# Patient Record
Sex: FEMALE | URBAN - METROPOLITAN AREA
[De-identification: names, ages, dates, MRNs, and addresses within clinical notes are randomized per-mention and may not be internally consistent; named-entity substitution may affect disease eponyms.]

---

## 2020-09-24 ENCOUNTER — OFFICE VISIT (OUTPATIENT)
Dept: URBAN - METROPOLITAN AREA CLINIC 115 | Facility: CLINIC | Age: 70
End: 2020-09-24

## 2020-10-15 ENCOUNTER — OFFICE VISIT (OUTPATIENT)
Dept: URBAN - METROPOLITAN AREA CLINIC 115 | Facility: CLINIC | Age: 70
End: 2020-10-15

## 2020-11-18 ENCOUNTER — OFFICE VISIT (OUTPATIENT)
Dept: URBAN - METROPOLITAN AREA CLINIC 82 | Facility: CLINIC | Age: 70
End: 2020-11-18
Payer: COMMERCIAL

## 2020-11-18 DIAGNOSIS — K22.70 BARRETT'S ESOPHAGUS: ICD-10-CM

## 2020-11-18 DIAGNOSIS — K59.00 CONSTIPATION: ICD-10-CM

## 2020-11-18 DIAGNOSIS — K57.90 DIVERTICULOSIS: ICD-10-CM

## 2020-11-18 DIAGNOSIS — Z12.11 COLON CANCER SCREENING: ICD-10-CM

## 2020-11-18 PROBLEM — 302914006 BARRETT'S ESOPHAGUS: Status: ACTIVE | Noted: 2020-11-18

## 2020-11-18 PROBLEM — 275978004 COLON CANCER SCREENING: Status: ACTIVE | Noted: 2020-11-18

## 2020-11-18 PROCEDURE — 1036F TOBACCO NON-USER: CPT | Performed by: INTERNAL MEDICINE

## 2020-11-18 PROCEDURE — G8417 CALC BMI ABV UP PARAM F/U: HCPCS | Performed by: INTERNAL MEDICINE

## 2020-11-18 PROCEDURE — G9903 PT SCRN TBCO ID AS NON USER: HCPCS | Performed by: INTERNAL MEDICINE

## 2020-11-18 PROCEDURE — G8427 DOCREV CUR MEDS BY ELIG CLIN: HCPCS | Performed by: INTERNAL MEDICINE

## 2020-11-18 PROCEDURE — 99244 OFF/OP CNSLTJ NEW/EST MOD 40: CPT | Performed by: INTERNAL MEDICINE

## 2020-11-18 PROCEDURE — 99204 OFFICE O/P NEW MOD 45 MIN: CPT | Performed by: INTERNAL MEDICINE

## 2020-11-18 PROCEDURE — G8482 FLU IMMUNIZE ORDER/ADMIN: HCPCS | Performed by: INTERNAL MEDICINE

## 2020-11-18 RX ORDER — ASCORBIC ACID 100 MG
1 TABLET TABLET,CHEWABLE ORAL ONCE A DAY
Status: ACTIVE | COMMUNITY

## 2020-11-18 RX ORDER — PLECANATIDE 3 MG/1
1 TABLET TABLET ORAL ONCE A DAY
Qty: 90 | Refills: 1 | OUTPATIENT
Start: 2020-11-18 | End: 2021-05-17

## 2020-11-18 RX ORDER — SODIUM, POTASSIUM,MAG SULFATES 17.5-3.13G
354 ML SOLUTION, RECONSTITUTED, ORAL ORAL
Qty: 354 ML | Refills: 0 | OUTPATIENT
Start: 2020-11-18 | End: 2020-11-19

## 2020-11-18 RX ORDER — AMLODIPINE BESYLATE 5 MG/1
1 TABLET TABLET ORAL ONCE A DAY
Status: ACTIVE | COMMUNITY

## 2020-11-18 RX ORDER — ZINC 25 MG
1 TABLET TABLET ORAL ONCE A DAY
Status: ACTIVE | COMMUNITY

## 2020-11-18 RX ORDER — MONTELUKAST SODIUM 10 MG/1
1 TABLET TABLET ORAL ONCE A DAY
Status: ACTIVE | COMMUNITY

## 2020-11-18 RX ORDER — PANTOPRAZOLE SODIUM 40 MG/1
1 TABLET TABLET, DELAYED RELEASE ORAL ONCE A DAY
Qty: 90 | Refills: 1 | OUTPATIENT
Start: 2020-11-18

## 2020-11-18 NOTE — HPI-TODAY'S VISIT:
2020 Patient is a 70 year old female, who presents on referral from Dr. Rosie Go,  for a colonoscopy. Previous colonoscopy was done on 2015 showed diverticulosis. EGD done 2015 showed gastritis and hiatal hernia. 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 Both procedures were done in [place].  Patient complains of change in bowel habits. She has mostly constipation. She complains of straining. She has tried to eat a bland diet for constipation. She has not tried any prescription medications for constipation. She has occasional heartburn and acid reflux. She is not on any PPI therapy. She denies any immediate family with colon cancer or colon polyps, but does mention that a cousin on her father's side, who  of colon cancer.

## 2020-12-09 ENCOUNTER — OFFICE VISIT (OUTPATIENT)
Dept: URBAN - METROPOLITAN AREA SURGERY CENTER 13 | Facility: SURGERY CENTER | Age: 70
End: 2020-12-09

## 2020-12-24 ENCOUNTER — OFFICE VISIT (OUTPATIENT)
Dept: URBAN - METROPOLITAN AREA SURGERY CENTER 13 | Facility: SURGERY CENTER | Age: 70
End: 2020-12-24

## 2020-12-30 ENCOUNTER — OFFICE VISIT (OUTPATIENT)
Dept: URBAN - METROPOLITAN AREA SURGERY CENTER 13 | Facility: SURGERY CENTER | Age: 70
End: 2020-12-30

## 2021-01-22 ENCOUNTER — OFFICE VISIT (OUTPATIENT)
Dept: URBAN - METROPOLITAN AREA SURGERY CENTER 13 | Facility: SURGERY CENTER | Age: 71
End: 2021-01-22
Payer: COMMERCIAL

## 2021-01-22 ENCOUNTER — CLAIMS CREATED FROM THE CLAIM WINDOW (OUTPATIENT)
Dept: URBAN - METROPOLITAN AREA CLINIC 4 | Facility: CLINIC | Age: 71
End: 2021-01-22
Payer: COMMERCIAL

## 2021-01-22 DIAGNOSIS — K62.6 ANAL ULCER: ICD-10-CM

## 2021-01-22 DIAGNOSIS — K63.89 OTHER SPECIFIED DISEASES OF INTESTINE: ICD-10-CM

## 2021-01-22 DIAGNOSIS — K62.6 ULCER OF ANUS AND RECTUM: ICD-10-CM

## 2021-01-22 DIAGNOSIS — Z12.11 COLON CANCER SCREENING: ICD-10-CM

## 2021-01-22 DIAGNOSIS — K62.1 DYSPLASTIC POLYP OF RECTUM: ICD-10-CM

## 2021-01-22 PROCEDURE — G8907 PT DOC NO EVENTS ON DISCHARG: HCPCS | Performed by: INTERNAL MEDICINE

## 2021-01-22 PROCEDURE — 45380 COLONOSCOPY AND BIOPSY: CPT | Performed by: INTERNAL MEDICINE

## 2021-01-22 PROCEDURE — 88305 TISSUE EXAM BY PATHOLOGIST: CPT | Performed by: PATHOLOGY

## 2021-01-27 ENCOUNTER — OFFICE VISIT (OUTPATIENT)
Dept: URBAN - METROPOLITAN AREA SURGERY CENTER 13 | Facility: SURGERY CENTER | Age: 71
End: 2021-01-27
Payer: COMMERCIAL

## 2021-01-27 ENCOUNTER — CLAIMS CREATED FROM THE CLAIM WINDOW (OUTPATIENT)
Dept: URBAN - METROPOLITAN AREA CLINIC 4 | Facility: CLINIC | Age: 71
End: 2021-01-27
Payer: COMMERCIAL

## 2021-01-27 DIAGNOSIS — R10.13 ABDOMINAL DISCOMFORT, EPIGASTRIC: ICD-10-CM

## 2021-01-27 DIAGNOSIS — K29.60 OTHER GASTRITIS WITHOUT BLEEDING: ICD-10-CM

## 2021-01-27 DIAGNOSIS — K31.89 ACQUIRED DEFORMITY OF DUODENUM: ICD-10-CM

## 2021-01-27 DIAGNOSIS — K22.719 BARRETT'S ESOPHAGUS WITH DYSPLASIA, UNSPECIFIED: ICD-10-CM

## 2021-01-27 DIAGNOSIS — K31.89 OTHER DISEASES OF STOMACH AND DUODENUM: ICD-10-CM

## 2021-01-27 DIAGNOSIS — K22.70 BARRETT ESOPHAGUS: ICD-10-CM

## 2021-01-27 PROCEDURE — 88342 IMHCHEM/IMCYTCHM 1ST ANTB: CPT | Performed by: PATHOLOGY

## 2021-01-27 PROCEDURE — G8907 PT DOC NO EVENTS ON DISCHARG: HCPCS | Performed by: INTERNAL MEDICINE

## 2021-01-27 PROCEDURE — 43239 EGD BIOPSY SINGLE/MULTIPLE: CPT | Performed by: INTERNAL MEDICINE

## 2021-01-27 PROCEDURE — 88305 TISSUE EXAM BY PATHOLOGIST: CPT | Performed by: PATHOLOGY

## 2021-01-27 PROCEDURE — 88312 SPECIAL STAINS GROUP 1: CPT | Performed by: PATHOLOGY

## 2021-02-17 ENCOUNTER — OFFICE VISIT (OUTPATIENT)
Dept: URBAN - METROPOLITAN AREA CLINIC 82 | Facility: CLINIC | Age: 71
End: 2021-02-17
Payer: COMMERCIAL

## 2021-02-17 VITALS
SYSTOLIC BLOOD PRESSURE: 161 MMHG | HEART RATE: 75 BPM | HEIGHT: 60 IN | DIASTOLIC BLOOD PRESSURE: 96 MMHG | WEIGHT: 181 LBS | BODY MASS INDEX: 35.53 KG/M2 | RESPIRATION RATE: 14 BRPM | TEMPERATURE: 97.1 F

## 2021-02-17 DIAGNOSIS — Z85.038 PERSONAL HISTORY OF COLON CANCER: ICD-10-CM

## 2021-02-17 DIAGNOSIS — K57.90 DIVERTICULOSIS: ICD-10-CM

## 2021-02-17 DIAGNOSIS — K22.719 BARRETT'S ESOPHAGUS WITH DYSPLASIA, UNSPECIFIED: ICD-10-CM

## 2021-02-17 DIAGNOSIS — K59.00 CONSTIPATION: ICD-10-CM

## 2021-02-17 DIAGNOSIS — K62.6 ULCER OF ANUS AND RECTUM: ICD-10-CM

## 2021-02-17 PROCEDURE — G9903 PT SCRN TBCO ID AS NON USER: HCPCS | Performed by: INTERNAL MEDICINE

## 2021-02-17 PROCEDURE — 99214 OFFICE O/P EST MOD 30 MIN: CPT | Performed by: INTERNAL MEDICINE

## 2021-02-17 PROCEDURE — G8417 CALC BMI ABV UP PARAM F/U: HCPCS | Performed by: INTERNAL MEDICINE

## 2021-02-17 PROCEDURE — G8427 DOCREV CUR MEDS BY ELIG CLIN: HCPCS | Performed by: INTERNAL MEDICINE

## 2021-02-17 RX ORDER — PANTOPRAZOLE SODIUM 40 MG/1
1 TABLET TABLET, DELAYED RELEASE ORAL ONCE A DAY
Qty: 90 | Refills: 1 | OUTPATIENT
Start: 2021-02-17

## 2021-02-17 RX ORDER — MONTELUKAST SODIUM 10 MG/1
1 TABLET TABLET ORAL ONCE A DAY
Status: ACTIVE | COMMUNITY

## 2021-02-17 RX ORDER — ZINC 25 MG
1 TABLET TABLET ORAL ONCE A DAY
Status: ACTIVE | COMMUNITY

## 2021-02-17 RX ORDER — PLECANATIDE 3 MG/1
1 TABLET TABLET ORAL ONCE A DAY
Qty: 90 | Refills: 1 | Status: ACTIVE | COMMUNITY
Start: 2020-11-18 | End: 2021-05-17

## 2021-02-17 RX ORDER — ASCORBIC ACID 100 MG
1 TABLET TABLET,CHEWABLE ORAL ONCE A DAY
Status: ACTIVE | COMMUNITY

## 2021-02-17 RX ORDER — PANTOPRAZOLE SODIUM 40 MG/1
1 TABLET TABLET, DELAYED RELEASE ORAL ONCE A DAY
Qty: 90 | Refills: 1 | Status: ACTIVE | COMMUNITY
Start: 2020-11-18

## 2021-02-17 RX ORDER — AMLODIPINE BESYLATE 5 MG/1
1 TABLET TABLET ORAL ONCE A DAY
Status: ACTIVE | COMMUNITY

## 2021-02-17 NOTE — HPI-TODAY'S VISIT:
2020 Patient is a 70 year old female, who presents on referral from Dr. Rosie Go,  for a colonoscopy. Previous colonoscopy was done on 2015 showed diverticulosis. EGD done 2015 showed gastritis and hiatal hernia. 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 Both procedures were done in [place]. Patient complains of change in bowel habits. She has mostly constipation. She complains of straining. She has tried to eat a bland diet for constipation. She has not tried any prescription medications for constipation. She has occasional heartburn and acid reflux. She is not on any PPI therapy. She denies any immediate family with colon cancer or colon polyps, but does mention that a cousin on her father's side, who  of colon cancer.   2021 Patient presents for a f/u office visit. Colonoscopy on 2021 showed rectal ulcer and polyp in the rectum. EGD on 2021 showed gastritis and esophagitis. Biopsies showed solitary rectal ulcer syndrome and hyperplastic polyp. 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 Her constipation has improved, still present but not as bad as previously. However, Trulance was too strong for her as it caused her diarrhea. She has started taking a detox tea because meatmucil has been causing her gas, but her bowel movements have improved in the mornings. She is scheduled to get a physical exam with her PCP Dr. Go in the next month.

## 2021-08-25 ENCOUNTER — OFFICE VISIT (OUTPATIENT)
Dept: URBAN - METROPOLITAN AREA CLINIC 82 | Facility: CLINIC | Age: 71
End: 2021-08-25
Payer: COMMERCIAL

## 2021-08-25 DIAGNOSIS — Z85.038 PERSONAL HISTORY OF COLON CANCER: ICD-10-CM

## 2021-08-25 DIAGNOSIS — K22.719 BARRETT'S ESOPHAGUS WITH DYSPLASIA, UNSPECIFIED: ICD-10-CM

## 2021-08-25 DIAGNOSIS — K62.6 ULCER OF ANUS AND RECTUM: ICD-10-CM

## 2021-08-25 DIAGNOSIS — R17 ELEVATED BILIRUBIN: ICD-10-CM

## 2021-08-25 DIAGNOSIS — K57.90 DIVERTICULOSIS: ICD-10-CM

## 2021-08-25 DIAGNOSIS — K59.00 CONSTIPATION: ICD-10-CM

## 2021-08-25 PROCEDURE — G9903 PT SCRN TBCO ID AS NON USER: HCPCS | Performed by: INTERNAL MEDICINE

## 2021-08-25 PROCEDURE — G8427 DOCREV CUR MEDS BY ELIG CLIN: HCPCS | Performed by: INTERNAL MEDICINE

## 2021-08-25 PROCEDURE — G8417 CALC BMI ABV UP PARAM F/U: HCPCS | Performed by: INTERNAL MEDICINE

## 2021-08-25 PROCEDURE — 99214 OFFICE O/P EST MOD 30 MIN: CPT | Performed by: INTERNAL MEDICINE

## 2021-08-25 RX ORDER — PANTOPRAZOLE SODIUM 40 MG/1
1 TABLET TABLET, DELAYED RELEASE ORAL ONCE A DAY
Qty: 90 | Refills: 1 | OUTPATIENT
Start: 2021-08-25

## 2021-08-25 RX ORDER — ASCORBIC ACID 100 MG
1 TABLET TABLET,CHEWABLE ORAL ONCE A DAY
Status: ACTIVE | COMMUNITY

## 2021-08-25 RX ORDER — PANTOPRAZOLE SODIUM 40 MG/1
1 TABLET TABLET, DELAYED RELEASE ORAL ONCE A DAY
Qty: 90 | Refills: 1 | Status: ACTIVE | COMMUNITY
Start: 2020-11-18

## 2021-08-25 RX ORDER — PANTOPRAZOLE SODIUM 40 MG/1
1 TABLET TABLET, DELAYED RELEASE ORAL ONCE A DAY
Qty: 90 | Refills: 1 | Status: ACTIVE | COMMUNITY
Start: 2021-02-17

## 2021-08-25 RX ORDER — MONTELUKAST SODIUM 10 MG/1
1 TABLET TABLET ORAL ONCE A DAY
Status: ACTIVE | COMMUNITY

## 2021-08-25 RX ORDER — AMLODIPINE BESYLATE 5 MG/1
1 TABLET TABLET ORAL ONCE A DAY
Status: ACTIVE | COMMUNITY

## 2021-08-25 RX ORDER — DICYCLOMINE HYDROCHLORIDE 10 MG/1
1 TABLET CAPSULE ORAL THREE TIMES A DAY
Qty: 90 | Refills: 1 | OUTPATIENT
Start: 2021-08-25 | End: 2021-10-24

## 2021-08-25 RX ORDER — ZINC 25 MG
1 TABLET TABLET ORAL ONCE A DAY
Status: ACTIVE | COMMUNITY

## 2021-08-25 NOTE — HPI-TODAY'S VISIT:
2020 Patient is a 70 year old female, who presents on referral from Dr. Rosie Go,  for a colonoscopy. Previous colonoscopy was done on 2015 showed diverticulosis. EGD done 2015 showed gastritis and hiatal hernia. 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 Both procedures were done in [place]. Patient complains of change in bowel habits. She has mostly constipation. She complains of straining. She has tried to eat a bland diet for constipation. She has not tried any prescription medications for constipation. She has occasional heartburn and acid reflux. She is not on any PPI therapy. She denies any immediate family with colon cancer or colon polyps, but does mention that a cousin on her father's side, who  of colon cancer.   2021 Patient presents for a f/u office visit. Colonoscopy on 2021 showed rectal ulcer and polyp in the rectum. EGD on 2021 showed gastritis and esophagitis. Biopsies showed solitary rectal ulcer syndrome and hyperplastic polyp. 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 Her constipation has improved, still present but not as bad as previously. However, Trulance was too strong for her as it caused her diarrhea. She has started taking a detox tea because meatmucil has been causing her gas, but her bowel movements have improved in the mornings. She is scheduled to get a physical exam with her PCP Dr. Go in the next month.  2021 Patient presents for a follow up office visit. Colonoscopy on 2021 showed rectal ulcer and polyp in the rectum. EGD on 2021 showed gastritis and esophagitis. Biopsies showed solitary rectal ulcer syndrome, hyperplastic polyp, and Landeros's esophagus. Labs on 2021 showed cholesterol is slightly high, total bilirubin is high (1.4).   She reports occasional bloating, "heavy feeling" in her epigastric area, especially when eating meat. She does eat lots of broccoli and cauliflower in her diet. She takes Protonix daily. Denies any constipation, diarrhea. She notices occasional black stool especially when eating black beans. She uses Tramadol as needed for pain from Dr. Go. Denies NSAID use. Patient does not use natural supplements.

## 2021-08-29 LAB
ANTI-SMOOTH MUSCLE AB BY IFA: (no result)
BILIRUBIN, DIRECT: 0.18
BILIRUBIN, INDIRECT: 0.82
BILIRUBIN, TOTAL: 1
HBSAG SCREEN: NEGATIVE
HEP A AB, IGM: NEGATIVE
HEP B CORE AB, IGM: NEGATIVE
HEP C VIRUS AB: <0.1
MITOCHONDRIAL (M2) ANTIBODY: <20

## 2021-09-02 ENCOUNTER — TELEPHONE ENCOUNTER (OUTPATIENT)
Dept: URBAN - METROPOLITAN AREA CLINIC 23 | Facility: CLINIC | Age: 71
End: 2021-09-02

## 2021-09-02 RX ORDER — PANTOPRAZOLE SODIUM 40 MG/1
1 TABLET TABLET, DELAYED RELEASE ORAL ONCE A DAY
Qty: 90 | Refills: 1
Start: 2021-08-25

## 2021-09-07 ENCOUNTER — TELEPHONE ENCOUNTER (OUTPATIENT)
Dept: URBAN - METROPOLITAN AREA SURGERY CENTER 30 | Facility: SURGERY CENTER | Age: 71
End: 2021-09-07

## 2021-09-07 RX ORDER — DICYCLOMINE HYDROCHLORIDE 10 MG/1
1 TABLET CAPSULE ORAL THREE TIMES A DAY
Qty: 90 TABLET | Refills: 2 | OUTPATIENT
Start: 2021-08-25 | End: 2021-12-05

## 2022-02-02 ENCOUNTER — OFFICE VISIT (OUTPATIENT)
Dept: URBAN - METROPOLITAN AREA CLINIC 82 | Facility: CLINIC | Age: 72
End: 2022-02-02

## 2022-03-01 ENCOUNTER — OFFICE VISIT (OUTPATIENT)
Dept: URBAN - METROPOLITAN AREA CLINIC 82 | Facility: CLINIC | Age: 72
End: 2022-03-01

## 2022-03-24 ENCOUNTER — WEB ENCOUNTER (OUTPATIENT)
Dept: URBAN - METROPOLITAN AREA CLINIC 82 | Facility: CLINIC | Age: 72
End: 2022-03-24

## 2022-03-30 ENCOUNTER — OFFICE VISIT (OUTPATIENT)
Dept: URBAN - METROPOLITAN AREA CLINIC 82 | Facility: CLINIC | Age: 72
End: 2022-03-30
Payer: COMMERCIAL

## 2022-03-30 DIAGNOSIS — K57.90 DIVERTICULOSIS: ICD-10-CM

## 2022-03-30 DIAGNOSIS — K62.6 ULCER OF ANUS AND RECTUM: ICD-10-CM

## 2022-03-30 DIAGNOSIS — K22.719 BARRETT'S ESOPHAGUS WITH DYSPLASIA, UNSPECIFIED: ICD-10-CM

## 2022-03-30 DIAGNOSIS — R17 ELEVATED BILIRUBIN: ICD-10-CM

## 2022-03-30 DIAGNOSIS — K59.00 CONSTIPATION: ICD-10-CM

## 2022-03-30 DIAGNOSIS — R14.0 BLOATING: ICD-10-CM

## 2022-03-30 DIAGNOSIS — Z85.038 PERSONAL HISTORY OF COLON CANCER: ICD-10-CM

## 2022-03-30 PROCEDURE — G8427 DOCREV CUR MEDS BY ELIG CLIN: HCPCS | Performed by: INTERNAL MEDICINE

## 2022-03-30 PROCEDURE — G8417 CALC BMI ABV UP PARAM F/U: HCPCS | Performed by: INTERNAL MEDICINE

## 2022-03-30 PROCEDURE — G9903 PT SCRN TBCO ID AS NON USER: HCPCS | Performed by: INTERNAL MEDICINE

## 2022-03-30 PROCEDURE — 99214 OFFICE O/P EST MOD 30 MIN: CPT | Performed by: INTERNAL MEDICINE

## 2022-03-30 RX ORDER — PANTOPRAZOLE SODIUM 40 MG/1
1 TABLET TABLET, DELAYED RELEASE ORAL ONCE A DAY
Qty: 90 | Refills: 1 | Status: ACTIVE | COMMUNITY
Start: 2020-11-18

## 2022-03-30 RX ORDER — ZINC 25 MG
1 TABLET TABLET ORAL ONCE A DAY
Status: ACTIVE | COMMUNITY

## 2022-03-30 RX ORDER — PANTOPRAZOLE SODIUM 40 MG/1
1 TABLET TABLET, DELAYED RELEASE ORAL ONCE A DAY
Qty: 90 | Refills: 1 | COMMUNITY
Start: 2021-02-17

## 2022-03-30 RX ORDER — MONTELUKAST SODIUM 10 MG/1
1 TABLET TABLET ORAL ONCE A DAY
Status: ACTIVE | COMMUNITY

## 2022-03-30 RX ORDER — PANTOPRAZOLE SODIUM 40 MG/1
1 TABLET TABLET, DELAYED RELEASE ORAL ONCE A DAY
Qty: 90 | Refills: 1 | COMMUNITY
Start: 2021-08-25

## 2022-03-30 RX ORDER — AMLODIPINE BESYLATE 5 MG/1
1 TABLET TABLET ORAL ONCE A DAY
Status: ACTIVE | COMMUNITY

## 2022-03-30 RX ORDER — ASCORBIC ACID 100 MG
1 TABLET TABLET,CHEWABLE ORAL ONCE A DAY
Status: ACTIVE | COMMUNITY

## 2022-03-30 NOTE — HPI-TODAY'S VISIT:
2020 Patient is a 70 year old female, who presents on referral from Dr. Rosie Go,  for a colonoscopy. Previous colonoscopy was done on 2015 showed diverticulosis. EGD done 2015 showed gastritis and hiatal hernia. 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 Both procedures were done in [place]. Patient complains of change in bowel habits. She has mostly constipation. She complains of straining. She has tried to eat a bland diet for constipation. She has not tried any prescription medications for constipation. She has occasional heartburn and acid reflux. She is not on any PPI therapy. She denies any immediate family with colon cancer or colon polyps, but does mention that a cousin on her father's side, who  of colon cancer.   2021 Patient presents for a f/u office visit. Colonoscopy on 2021 showed rectal ulcer and polyp in the rectum. EGD on 2021 showed gastritis and esophagitis. Biopsies showed solitary rectal ulcer syndrome and hyperplastic polyp. 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 Her constipation has improved, still present but not as bad as previously. However, Trulance was too strong for her as it caused her diarrhea. She has started taking a detox tea because meatmucil has been causing her gas, but her bowel movements have improved in the mornings. She is scheduled to get a physical exam with her PCP Dr. Go in the next month.  2021 Patient presents for a follow up office visit. Colonoscopy on 2021 showed rectal ulcer and polyp in the rectum. EGD on 2021 showed gastritis and esophagitis. Biopsies showed solitary rectal ulcer syndrome, hyperplastic polyp, and Landeros's esophagus. Labs on 2021 showed cholesterol is slightly high, total bilirubin is high (1.4).   She reports occasional bloating, "heavy feeling" in her epigastric area, especially when eating meat. She does eat lots of broccoli and cauliflower in her diet. She takes Protonix daily. Denies any constipation, diarrhea. She notices occasional black stool especially when eating black beans. She uses Tramadol as needed for pain from Dr. Go. Denies NSAID use. Patient does not use natural supplements.  3/30/2022 Patient presents for follow up. Labs on 2021 showed elevated indirect bilirubin. Patient denies any abdominal pain but she complains of RUQ "heaviness and swelling." She denies any constipation or diarrhea. Recent US with Dr. Go was negative. She denies any heartburn or acid reflux. Patient complains of radiating back pain which causes her insomnia. She is being followed by orthopedic specialist. Patient's twin brother had supranuclear cerebral palsy. She is seeking evaluation by rheumatologist for joint pain.

## 2022-03-30 NOTE — PHYSICAL EXAM GASTROINTESTINAL
Abdomen , soft, nontender, RUQ distended , no guarding or rigidity , no masses palpable , hyperactive bowel sounds , Liver and Spleen , no hepatomegaly present , no hepatosplenomegaly , liver nontender , spleen not palpable

## 2022-09-28 ENCOUNTER — OFFICE VISIT (OUTPATIENT)
Dept: URBAN - METROPOLITAN AREA CLINIC 82 | Facility: CLINIC | Age: 72
End: 2022-09-28

## 2022-11-08 ENCOUNTER — OFFICE VISIT (OUTPATIENT)
Dept: URBAN - METROPOLITAN AREA CLINIC 82 | Facility: CLINIC | Age: 72
End: 2022-11-08

## 2022-11-09 ENCOUNTER — OFFICE VISIT (OUTPATIENT)
Dept: URBAN - METROPOLITAN AREA CLINIC 82 | Facility: CLINIC | Age: 72
End: 2022-11-09

## 2022-12-21 ENCOUNTER — OFFICE VISIT (OUTPATIENT)
Dept: URBAN - METROPOLITAN AREA CLINIC 82 | Facility: CLINIC | Age: 72
End: 2022-12-21

## 2023-02-22 ENCOUNTER — LAB OUTSIDE AN ENCOUNTER (OUTPATIENT)
Dept: URBAN - METROPOLITAN AREA CLINIC 82 | Facility: CLINIC | Age: 73
End: 2023-02-22

## 2023-02-22 ENCOUNTER — DASHBOARD ENCOUNTERS (OUTPATIENT)
Age: 73
End: 2023-02-22

## 2023-02-22 ENCOUNTER — OFFICE VISIT (OUTPATIENT)
Dept: URBAN - METROPOLITAN AREA CLINIC 82 | Facility: CLINIC | Age: 73
End: 2023-02-22
Payer: COMMERCIAL

## 2023-02-22 VITALS
TEMPERATURE: 97.7 F | BODY MASS INDEX: 35.55 KG/M2 | WEIGHT: 193.2 LBS | DIASTOLIC BLOOD PRESSURE: 81 MMHG | HEART RATE: 76 BPM | HEIGHT: 62 IN | SYSTOLIC BLOOD PRESSURE: 135 MMHG | RESPIRATION RATE: 16 BRPM

## 2023-02-22 DIAGNOSIS — R17 ELEVATED BILIRUBIN: ICD-10-CM

## 2023-02-22 DIAGNOSIS — K59.00 CONSTIPATION: ICD-10-CM

## 2023-02-22 DIAGNOSIS — K22.719 BARRETT'S ESOPHAGUS WITH DYSPLASIA, UNSPECIFIED: ICD-10-CM

## 2023-02-22 DIAGNOSIS — Z85.038 PERSONAL HISTORY OF COLON CANCER: ICD-10-CM

## 2023-02-22 DIAGNOSIS — R14.0 BLOATING: ICD-10-CM

## 2023-02-22 DIAGNOSIS — K57.90 DIVERTICULOSIS: ICD-10-CM

## 2023-02-22 DIAGNOSIS — K62.6 ULCER OF ANUS AND RECTUM: ICD-10-CM

## 2023-02-22 PROBLEM — 302914006 BARRETT'S ESOPHAGUS: Status: ACTIVE | Noted: 2021-02-17

## 2023-02-22 PROBLEM — 398050005 DIVERTICULAR DISEASE OF COLON: Status: ACTIVE | Noted: 2020-11-18

## 2023-02-22 PROBLEM — 429699009: Status: ACTIVE | Noted: 2021-02-17

## 2023-02-22 PROBLEM — 14760008 CONSTIPATION: Status: ACTIVE | Noted: 2020-11-18

## 2023-02-22 PROCEDURE — 99214 OFFICE O/P EST MOD 30 MIN: CPT | Performed by: INTERNAL MEDICINE

## 2023-02-22 PROCEDURE — G8417 CALC BMI ABV UP PARAM F/U: HCPCS | Performed by: INTERNAL MEDICINE

## 2023-02-22 PROCEDURE — G8427 DOCREV CUR MEDS BY ELIG CLIN: HCPCS | Performed by: INTERNAL MEDICINE

## 2023-02-22 PROCEDURE — G9903 PT SCRN TBCO ID AS NON USER: HCPCS | Performed by: INTERNAL MEDICINE

## 2023-02-22 RX ORDER — FAMOTIDINE 40 MG/1
1 TABLET TABLET, FILM COATED ORAL TWICE A DAY
Qty: 180 TABLET | Refills: 1 | OUTPATIENT
Start: 2023-02-22

## 2023-02-22 RX ORDER — PANTOPRAZOLE SODIUM 40 MG/1
1 TABLET TABLET, DELAYED RELEASE ORAL ONCE A DAY
Qty: 90 | Refills: 1 | OUTPATIENT
Start: 2023-02-22

## 2023-02-22 RX ORDER — PANTOPRAZOLE SODIUM 40 MG/1
1 TABLET TABLET, DELAYED RELEASE ORAL ONCE A DAY
Qty: 90 | Refills: 1 | Status: ACTIVE | COMMUNITY
Start: 2021-02-17

## 2023-02-22 RX ORDER — MONTELUKAST SODIUM 10 MG/1
1 TABLET TABLET ORAL ONCE A DAY
Status: ACTIVE | COMMUNITY

## 2023-02-22 RX ORDER — ASCORBIC ACID 100 MG
1 TABLET TABLET,CHEWABLE ORAL ONCE A DAY
Status: ACTIVE | COMMUNITY

## 2023-02-22 RX ORDER — PANTOPRAZOLE SODIUM 40 MG/1
1 TABLET TABLET, DELAYED RELEASE ORAL ONCE A DAY
Qty: 90 | Refills: 1 | Status: ACTIVE | COMMUNITY
Start: 2020-11-18

## 2023-02-22 RX ORDER — AMLODIPINE BESYLATE 5 MG/1
1 TABLET TABLET ORAL ONCE A DAY
Status: ACTIVE | COMMUNITY

## 2023-02-22 RX ORDER — PANTOPRAZOLE SODIUM 40 MG/1
1 TABLET TABLET, DELAYED RELEASE ORAL ONCE A DAY
Qty: 90 | Refills: 1 | Status: ACTIVE | COMMUNITY
Start: 2021-08-25

## 2023-02-22 RX ORDER — ZINC 25 MG
1 TABLET TABLET ORAL ONCE A DAY
Status: ACTIVE | COMMUNITY

## 2023-02-22 NOTE — HPI-TODAY'S VISIT:
2020 Patient is a 70 year old female, who presents on referral from Dr. Rosie Go,  for a colonoscopy. Previous colonoscopy was done on 2015 showed diverticulosis. EGD done 2015 showed gastritis and hiatal hernia. 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 Both procedures were done in [place]. Patient complains of change in bowel habits. She has mostly constipation. She complains of straining. She has tried to eat a bland diet for constipation. She has not tried any prescription medications for constipation. She has occasional heartburn and acid reflux. She is not on any PPI therapy. She denies any immediate family with colon cancer or colon polyps, but does mention that a cousin on her father's side, who  of colon cancer.   2021 Patient presents for a f/u office visit. Colonoscopy on 2021 showed rectal ulcer and polyp in the rectum. EGD on 2021 showed gastritis and esophagitis. Biopsies showed solitary rectal ulcer syndrome and hyperplastic polyp. 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 Her constipation has improved, still present but not as bad as previously. However, Trulance was too strong for her as it caused her diarrhea. She has started taking a detox tea because meatmucil has been causing her gas, but her bowel movements have improved in the mornings. She is scheduled to get a physical exam with her PCP Dr. Go in the next month.  2021 Patient presents for a follow up office visit. Colonoscopy on 2021 showed rectal ulcer and polyp in the rectum. EGD on 2021 showed gastritis and esophagitis. Biopsies showed solitary rectal ulcer syndrome, hyperplastic polyp, and Landeros's esophagus. Labs on 2021 showed cholesterol is slightly high, total bilirubin is high (1.4).   She reports occasional bloating, "heavy feeling" in her epigastric area, especially when eating meat. She does eat lots of broccoli and cauliflower in her diet. She takes Protonix daily. Denies any constipation, diarrhea. She notices occasional black stool especially when eating black beans. She uses Tramadol as needed for pain from Dr. Go. Denies NSAID use. Patient does not use natural supplements.  3/30/2022 Patient presents for follow up. Labs on 2021 showed elevated indirect bilirubin. Patient denies any abdominal pain but she complains of RUQ "heaviness and swelling." She denies any constipation or diarrhea. Recent US with Dr. Go was negative. She denies any heartburn or acid reflux. Patient complains of radiating back pain which causes her insomnia. She is being followed by orthopedic specialist. Patient's twin brother had supranuclear cerebral palsy. She is seeking evaluation by rheumatologist for joint pain.  2023 Patient presents for a 6 month follow up.still havinf reflux sx, no duysphagia, constipation better

## 2023-02-22 NOTE — PHYSICAL EXAM CHEST:
no lesions, no deformities, no traumatic injuries, no significant scars are present, chest wall non-tender, no masses present, breathing is unlabored without accessory muscle use,normal breath sounds , no lesions, no deformities, no traumatic injuries, no significant scars are present, chest wall non-tender, no masses present, breathing is unlabored without accessory muscle use,normal breath sounds

## 2023-02-22 NOTE — PHYSICAL EXAM EYES:
Conjunctivae and eyelids appear normal,  Sclerae : White without injection  , Conjunctivae and eyelids appear normal,  Sclerae : White without injection

## 2023-02-22 NOTE — PHYSICAL EXAM NECK/THYROID:
normal appearance , without tenderness upon palpation , no deformities , trachea midline , Thyroid normal size , no masses , thyroid nontender , normal appearance , without tenderness upon palpation , no deformities , trachea midline , Thyroid normal size , no masses , thyroid nontender

## 2023-02-22 NOTE — PHYSICAL EXAM SKIN:
no rashes , no jaundice present , good turgor , no masses , no tenderness on palpation , no rashes , no jaundice present , good turgor , no masses , no tenderness on palpation

## 2023-02-22 NOTE — PHYSICAL EXAM HENT:
Head, normocephalic, atraumatic, Face, Face within normal limits, Ears, External ears within normal limits, Nose/Nasopharynx, External nose normal appearance, nares patent, no nasal discharge, Mouth and Throat, Oral cavity appearance normal, Lips, Appearance normal , Head, normocephalic, atraumatic, Face, Face within normal limits, Ears, External ears within normal limits, Nose/Nasopharynx, External nose normal appearance, nares patent, no nasal discharge, Mouth and Throat, Oral cavity appearance normal, Lips, Appearance normal

## 2023-03-29 ENCOUNTER — OFFICE VISIT (OUTPATIENT)
Dept: URBAN - METROPOLITAN AREA SURGERY CENTER 13 | Facility: SURGERY CENTER | Age: 73
End: 2023-03-29

## 2023-05-10 ENCOUNTER — CLAIMS CREATED FROM THE CLAIM WINDOW (OUTPATIENT)
Dept: URBAN - METROPOLITAN AREA CLINIC 4 | Facility: CLINIC | Age: 73
End: 2023-05-10
Payer: COMMERCIAL

## 2023-05-10 ENCOUNTER — CLAIMS CREATED FROM THE CLAIM WINDOW (OUTPATIENT)
Dept: URBAN - METROPOLITAN AREA SURGERY CENTER 13 | Facility: SURGERY CENTER | Age: 73
End: 2023-05-10

## 2023-05-10 ENCOUNTER — CLAIMS CREATED FROM THE CLAIM WINDOW (OUTPATIENT)
Dept: URBAN - METROPOLITAN AREA SURGERY CENTER 13 | Facility: SURGERY CENTER | Age: 73
End: 2023-05-10
Payer: COMMERCIAL

## 2023-05-10 DIAGNOSIS — K22.719 BARRETT'S ESOPHAGUS WITH DYSPLASIA: ICD-10-CM

## 2023-05-10 DIAGNOSIS — R10.13 ABDOMINAL DISCOMFORT, EPIGASTRIC: ICD-10-CM

## 2023-05-10 DIAGNOSIS — K31.89 ACQUIRED DEFORMITY OF DUODENUM: ICD-10-CM

## 2023-05-10 DIAGNOSIS — K31.89 OTHER DISEASES OF STOMACH AND DUODENUM: ICD-10-CM

## 2023-05-10 DIAGNOSIS — K22.2 ACQUIRED ESOPHAGEAL RING: ICD-10-CM

## 2023-05-10 DIAGNOSIS — K29.70 GASTRITIS, UNSPECIFIED, WITHOUT BLEEDING: ICD-10-CM

## 2023-05-10 DIAGNOSIS — K22.719 BARRETT'S ESOPHAGUS WITH DYSPLASIA, UNSPECIFIED: ICD-10-CM

## 2023-05-10 DIAGNOSIS — K29.70 CHRONIC ACITVE GASTRITIS (H.PYLORI NEGATIVE): ICD-10-CM

## 2023-05-10 DIAGNOSIS — R13.19 CERVICAL DYSPHAGIA: ICD-10-CM

## 2023-05-10 PROCEDURE — 88313 SPECIAL STAINS GROUP 2: CPT | Performed by: PATHOLOGY

## 2023-05-10 PROCEDURE — 43248 EGD GUIDE WIRE INSERTION: CPT | Performed by: INTERNAL MEDICINE

## 2023-05-10 PROCEDURE — 43239 EGD BIOPSY SINGLE/MULTIPLE: CPT | Performed by: INTERNAL MEDICINE

## 2023-05-10 PROCEDURE — 88305 TISSUE EXAM BY PATHOLOGIST: CPT | Performed by: PATHOLOGY

## 2023-05-10 PROCEDURE — 88342 IMHCHEM/IMCYTCHM 1ST ANTB: CPT | Performed by: PATHOLOGY

## 2023-05-10 PROCEDURE — 88312 SPECIAL STAINS GROUP 1: CPT | Performed by: PATHOLOGY

## 2023-05-10 PROCEDURE — G8907 PT DOC NO EVENTS ON DISCHARG: HCPCS | Performed by: INTERNAL MEDICINE

## 2023-05-10 RX ORDER — PANTOPRAZOLE SODIUM 40 MG/1
1 TABLET TABLET, DELAYED RELEASE ORAL ONCE A DAY
Qty: 90 | Refills: 1 | Status: ACTIVE | COMMUNITY
Start: 2021-02-17

## 2023-05-10 RX ORDER — ASCORBIC ACID 100 MG
1 TABLET TABLET,CHEWABLE ORAL ONCE A DAY
Status: ACTIVE | COMMUNITY

## 2023-05-10 RX ORDER — PANTOPRAZOLE SODIUM 40 MG/1
1 TABLET TABLET, DELAYED RELEASE ORAL ONCE A DAY
Qty: 90 | Refills: 1 | Status: ACTIVE | COMMUNITY
Start: 2021-08-25

## 2023-05-10 RX ORDER — FAMOTIDINE 40 MG/1
1 TABLET TABLET, FILM COATED ORAL TWICE A DAY
Qty: 180 TABLET | Refills: 1 | Status: ACTIVE | COMMUNITY
Start: 2023-02-22

## 2023-05-10 RX ORDER — PANTOPRAZOLE SODIUM 40 MG/1
1 TABLET TABLET, DELAYED RELEASE ORAL ONCE A DAY
Qty: 90 | Refills: 1 | Status: ACTIVE | COMMUNITY
Start: 2023-02-22

## 2023-05-10 RX ORDER — AMLODIPINE BESYLATE 5 MG/1
1 TABLET TABLET ORAL ONCE A DAY
Status: ACTIVE | COMMUNITY

## 2023-05-10 RX ORDER — ZINC 25 MG
1 TABLET TABLET ORAL ONCE A DAY
Status: ACTIVE | COMMUNITY

## 2023-05-10 RX ORDER — MONTELUKAST SODIUM 10 MG/1
1 TABLET TABLET ORAL ONCE A DAY
Status: ACTIVE | COMMUNITY

## 2023-05-10 RX ORDER — PANTOPRAZOLE SODIUM 40 MG/1
1 TABLET TABLET, DELAYED RELEASE ORAL ONCE A DAY
Qty: 90 | Refills: 1 | Status: ACTIVE | COMMUNITY
Start: 2020-11-18

## 2023-05-30 ENCOUNTER — TELEPHONE ENCOUNTER (OUTPATIENT)
Dept: URBAN - METROPOLITAN AREA CLINIC 82 | Facility: CLINIC | Age: 73
End: 2023-05-30

## 2023-08-21 ENCOUNTER — OFFICE VISIT (OUTPATIENT)
Dept: URBAN - NONMETROPOLITAN AREA CLINIC 4 | Facility: CLINIC | Age: 73
End: 2023-08-21